# Patient Record
Sex: MALE | Race: WHITE | NOT HISPANIC OR LATINO | Employment: FULL TIME | URBAN - METROPOLITAN AREA
[De-identification: names, ages, dates, MRNs, and addresses within clinical notes are randomized per-mention and may not be internally consistent; named-entity substitution may affect disease eponyms.]

---

## 2017-08-30 ENCOUNTER — NON-PROVIDER VISIT (OUTPATIENT)
Dept: URGENT CARE | Facility: CLINIC | Age: 62
End: 2017-08-30

## 2017-08-30 PROCEDURE — 8907 PR URINE COLLECT ONLY: Performed by: PHYSICIAN ASSISTANT

## 2019-12-13 ENCOUNTER — HOSPITAL ENCOUNTER (EMERGENCY)
Facility: MEDICAL CENTER | Age: 64
End: 2019-12-13
Attending: EMERGENCY MEDICINE
Payer: COMMERCIAL

## 2019-12-13 ENCOUNTER — OFFICE VISIT (OUTPATIENT)
Dept: URGENT CARE | Facility: MEDICAL CENTER | Age: 64
End: 2019-12-13
Payer: COMMERCIAL

## 2019-12-13 VITALS
SYSTOLIC BLOOD PRESSURE: 120 MMHG | DIASTOLIC BLOOD PRESSURE: 74 MMHG | HEART RATE: 73 BPM | TEMPERATURE: 99.4 F | HEIGHT: 69 IN | BODY MASS INDEX: 25.74 KG/M2 | OXYGEN SATURATION: 98 % | WEIGHT: 173.8 LBS | RESPIRATION RATE: 16 BRPM

## 2019-12-13 VITALS
SYSTOLIC BLOOD PRESSURE: 115 MMHG | HEART RATE: 86 BPM | TEMPERATURE: 97.9 F | OXYGEN SATURATION: 96 % | RESPIRATION RATE: 18 BRPM | HEIGHT: 69 IN | BODY MASS INDEX: 25.83 KG/M2 | DIASTOLIC BLOOD PRESSURE: 90 MMHG | WEIGHT: 174.38 LBS

## 2019-12-13 DIAGNOSIS — R07.9 CHEST PAIN, UNSPECIFIED TYPE: ICD-10-CM

## 2019-12-13 DIAGNOSIS — J18.9 ATYPICAL PNEUMONIA: ICD-10-CM

## 2019-12-13 DIAGNOSIS — R05.9 COUGH: ICD-10-CM

## 2019-12-13 DIAGNOSIS — R53.83 OTHER FATIGUE: ICD-10-CM

## 2019-12-13 PROCEDURE — 99283 EMERGENCY DEPT VISIT LOW MDM: CPT

## 2019-12-13 PROCEDURE — 99203 OFFICE O/P NEW LOW 30 MIN: CPT | Performed by: FAMILY MEDICINE

## 2019-12-13 RX ORDER — AZITHROMYCIN 250 MG/1
TABLET, FILM COATED ORAL
Qty: 6 TAB | Refills: 0 | Status: SHIPPED | OUTPATIENT
Start: 2019-12-13

## 2019-12-13 NOTE — ED TRIAGE NOTES
"Chief Complaint   Patient presents with   • Cold Symptoms     x 1 month with productive cough     Pt reports that he would like abx for symptoms lasting >1 month. Sent from  for chest xray. NAD  /85   Pulse 86   Temp 36.6 °C (97.9 °F) (Temporal)   Resp 18   Ht 1.753 m (5' 9\")   Wt 79.1 kg (174 lb 6.1 oz)   SpO2 96%   Pt informed of wait times. Educated on triage process.  Asked to return to triage RN for any new or worsening of symptoms. Thanked for patience.        "

## 2019-12-14 NOTE — ED PROVIDER NOTES
"ED Provider Note    CHIEF COMPLAINT  Chief Complaint   Patient presents with   • Cold Symptoms     x 1 month with productive cough       HPI  Marie Marlow is a 64 y.o. male who presents requesting antibiotics for a cough that has been present for the past month or so associated with congestion of his sinuses, he went to urgent care where they suggested he had pneumonia and sent him here for an x-ray.  The patient just wants antibiotics, he has no fever, he has no chest pain or back pain or abdominal pain or vomiting.  He offers no other specific complaints at this time.  He did not get his flu shot this year stating that he does not believe in it although admits that he gets a pretty severe flulike illness over a year.    REVIEW OF SYSTEMS  Negative for fever, rash, chest pain, dyspnea, abdominal pain, nausea, vomiting, diarrhea, headache, focal weakness, focal numbness, focal tingling, back pain. All other systems are negative.     PAST MEDICAL HISTORY  Past Medical History:   Diagnosis Date   • Hypertension        FAMILY HISTORY  No family history on file.    SOCIAL HISTORY  Social History     Tobacco Use   • Smoking status: Never Smoker   Substance Use Topics   • Alcohol use: Yes   • Drug use: No       SURGICAL HISTORY  Past Surgical History:   Procedure Laterality Date   • SHOULDER ARTHROSCOPY         CURRENT MEDICATIONS  I personally reviewed the medication list in the charting documentation.     ALLERGIES  No Known Allergies    MEDICAL RECORD  I have reviewed patient's medical record and pertinent results are listed above.      PHYSICAL EXAM  VITAL SIGNS: /85   Pulse 86   Temp 36.6 °C (97.9 °F) (Temporal)   Resp 18   Ht 1.753 m (5' 9\")   Wt 79.1 kg (174 lb 6.1 oz)   SpO2 96%   BMI 25.75 kg/m²    Constitutional: Well appearing patient in no acute distress.  Not toxic, nor ill in appearance.  Sitting in the chair without any evidence of distress  HENT: Mucus membranes moist.    Eyes: No scleral " icterus. Normal conjunctiva   Neck: Supple, comfortable, nonpainful range of motion.   Cardiovascular: Regular heart rate and rhythm.   Thorax & Lungs: Chest is nontender.  Lungs are clear to auscultation with good air movement bilaterally.  No wheeze, rhonchi, nor rales.   Skin: Warm, dry. No rash appreciated  Neurologic: Alert & oriented. No focal deficits observed.   Psychiatric: Normal affect appropriate for the clinical situation.    COURSE & MEDICAL DECISION MAKING  I have reviewed any medical record information, laboratory studies and radiographic results as noted above.    Encounter Summary: This is a 64 y.o. male with productive cough for a month, no fever, sent here to rule out pneumonia, he is not hypoxic and on exam his lungs are clear.  His vital signs are normal.  At this point I suspect the patient likely has a viral syndrome but he is pretty adamant that he would like some antibiotics, after a month of productive cough this certainly could be an atypical pneumonia, oral antibiotics would be the treatment of choice given he is not ill-appearing or hypoxic so no indication for an x-ray.  Will be treated with azithromycin, strict return instruction will be discussed      DISPOSITION: Discharged home in stable condition      FINAL IMPRESSION  1. Atypical pneumonia    2. Cough           This dictation was created using voice recognition software. The accuracy of the dictation is limited to the abilities of the software. I expect there may be some errors of grammar and possibly content. The nursing notes were reviewed and certain aspects of this information were incorporated into this note.    Electronically signed by: José Carter, 12/13/2019 5:20 PM

## 2019-12-14 NOTE — PROGRESS NOTES
"Subjective:      Marie Marlow is a 64 y.o. male who presents with Sinus Pain ([id number], headache, sinus pain, chest congestion, fatigue)            This is a new problem.  64-year-old male presenting for evaluation of chest congestion and cough for past month.  He also has noticed midsternal chest pain for the past couple days.  He denies any fever chills but has noticed extreme fatigue.  He is otherwise healthy.  He is not a smoker.  No travel history or exposure to other ill contacts or pneumonia.  No history of CAD or early CAD family history.  Denies any dizziness or lightheadedness.  No fever reported.      Review of Systems   All other systems reviewed and are negative.         Objective:     /74 (BP Location: Left arm, Patient Position: Sitting, BP Cuff Size: Large adult)   Pulse 73   Temp 37.4 °C (99.4 °F) (Temporal)   Resp 16   Ht 1.753 m (5' 9\")   Wt 78.8 kg (173 lb 12.8 oz)   SpO2 98%   BMI 25.67 kg/m²      Physical Exam  Constitutional:       General: He is not in acute distress.     Appearance: He is not ill-appearing, toxic-appearing or diaphoretic.   HENT:      Head: Normocephalic and atraumatic.      Right Ear: Tympanic membrane, ear canal and external ear normal.      Left Ear: Tympanic membrane, ear canal and external ear normal.      Nose: No rhinorrhea.      Mouth/Throat:      Mouth: Mucous membranes are moist.      Pharynx: Oropharynx is clear. No oropharyngeal exudate or posterior oropharyngeal erythema.   Eyes:      General: No scleral icterus.     Conjunctiva/sclera: Conjunctivae normal.   Neck:      Musculoskeletal: Neck supple. No muscular tenderness.   Cardiovascular:      Rate and Rhythm: Normal rate and regular rhythm.      Heart sounds: No murmur. No friction rub. No gallop.    Pulmonary:      Effort: Pulmonary effort is normal. No respiratory distress.      Breath sounds: No wheezing, rhonchi or rales.   Lymphadenopathy:      Cervical: No cervical adenopathy.   Skin:    "  General: Skin is warm.      Coloration: Skin is not jaundiced or pale.      Findings: No rash.   Neurological:      Mental Status: He is alert and oriented to person, place, and time.   Psychiatric:         Mood and Affect: Mood normal.          EKG shows normal sinus rhythm with no acute changes.     Assessment/Plan:     1. Chest pain, unspecified type  - EKG - Clinic Performed    2. Cough    3. Other fatigue      Patient reports a month history of cough which would definitely require a chest x-ray which we unfortunately cannot do at the present time in our clinic as our radiology diverting and not accepting any x-rays from us because they are backed up.  He also complaining of midsternal chest pain for the past couple days in a 64-year-old non-smoker which would require basic cardiac work-up.  I decided therefore to send this patient to our emergency department for further evaluation

## 2019-12-28 ENCOUNTER — HOSPITAL ENCOUNTER (EMERGENCY)
Facility: MEDICAL CENTER | Age: 64
End: 2019-12-28
Attending: EMERGENCY MEDICINE
Payer: COMMERCIAL

## 2019-12-28 VITALS
RESPIRATION RATE: 18 BRPM | WEIGHT: 176.37 LBS | OXYGEN SATURATION: 97 % | TEMPERATURE: 97.5 F | BODY MASS INDEX: 26.12 KG/M2 | HEART RATE: 73 BPM | SYSTOLIC BLOOD PRESSURE: 126 MMHG | HEIGHT: 69 IN | DIASTOLIC BLOOD PRESSURE: 77 MMHG

## 2019-12-28 DIAGNOSIS — R11.2 NON-INTRACTABLE VOMITING WITH NAUSEA, UNSPECIFIED VOMITING TYPE: ICD-10-CM

## 2019-12-28 LAB
ALBUMIN SERPL BCP-MCNC: 4.3 G/DL (ref 3.2–4.9)
ALBUMIN/GLOB SERPL: 1.3 G/DL
ALP SERPL-CCNC: 86 U/L (ref 30–99)
ALT SERPL-CCNC: 31 U/L (ref 2–50)
ANION GAP SERPL CALC-SCNC: 14 MMOL/L (ref 0–11.9)
AST SERPL-CCNC: 28 U/L (ref 12–45)
BASOPHILS # BLD AUTO: 1.1 % (ref 0–1.8)
BASOPHILS # BLD: 0.07 K/UL (ref 0–0.12)
BILIRUB SERPL-MCNC: 0.3 MG/DL (ref 0.1–1.5)
BUN SERPL-MCNC: 18 MG/DL (ref 8–22)
CALCIUM SERPL-MCNC: 9.3 MG/DL (ref 8.4–10.2)
CHLORIDE SERPL-SCNC: 103 MMOL/L (ref 96–112)
CO2 SERPL-SCNC: 24 MMOL/L (ref 20–33)
CREAT SERPL-MCNC: 1.36 MG/DL (ref 0.5–1.4)
EKG IMPRESSION: NORMAL
EOSINOPHIL # BLD AUTO: 0.22 K/UL (ref 0–0.51)
EOSINOPHIL NFR BLD: 3.3 % (ref 0–6.9)
ERYTHROCYTE [DISTWIDTH] IN BLOOD BY AUTOMATED COUNT: 39 FL (ref 35.9–50)
GLOBULIN SER CALC-MCNC: 3.3 G/DL (ref 1.9–3.5)
GLUCOSE SERPL-MCNC: 105 MG/DL (ref 65–99)
HCT VFR BLD AUTO: 44.3 % (ref 42–52)
HGB BLD-MCNC: 15.2 G/DL (ref 14–18)
IMM GRANULOCYTES # BLD AUTO: 0.02 K/UL (ref 0–0.11)
IMM GRANULOCYTES NFR BLD AUTO: 0.3 % (ref 0–0.9)
LIPASE SERPL-CCNC: 36 U/L (ref 7–58)
LYMPHOCYTES # BLD AUTO: 2.23 K/UL (ref 1–4.8)
LYMPHOCYTES NFR BLD: 33.7 % (ref 22–41)
MCH RBC QN AUTO: 30.3 PG (ref 27–33)
MCHC RBC AUTO-ENTMCNC: 34.3 G/DL (ref 33.7–35.3)
MCV RBC AUTO: 88.4 FL (ref 81.4–97.8)
MONOCYTES # BLD AUTO: 0.54 K/UL (ref 0–0.85)
MONOCYTES NFR BLD AUTO: 8.2 % (ref 0–13.4)
NEUTROPHILS # BLD AUTO: 3.53 K/UL (ref 1.82–7.42)
NEUTROPHILS NFR BLD: 53.4 % (ref 44–72)
NRBC # BLD AUTO: 0 K/UL
NRBC BLD-RTO: 0 /100 WBC
PLATELET # BLD AUTO: 299 K/UL (ref 164–446)
PMV BLD AUTO: 9.5 FL (ref 9–12.9)
POTASSIUM SERPL-SCNC: 4.2 MMOL/L (ref 3.6–5.5)
PROT SERPL-MCNC: 7.6 G/DL (ref 6–8.2)
RBC # BLD AUTO: 5.01 M/UL (ref 4.7–6.1)
SODIUM SERPL-SCNC: 141 MMOL/L (ref 135–145)
TROPONIN T SERPL-MCNC: 14 NG/L (ref 6–19)
WBC # BLD AUTO: 6.6 K/UL (ref 4.8–10.8)

## 2019-12-28 PROCEDURE — 84484 ASSAY OF TROPONIN QUANT: CPT

## 2019-12-28 PROCEDURE — 93005 ELECTROCARDIOGRAM TRACING: CPT | Performed by: EMERGENCY MEDICINE

## 2019-12-28 PROCEDURE — 96374 THER/PROPH/DIAG INJ IV PUSH: CPT

## 2019-12-28 PROCEDURE — 83690 ASSAY OF LIPASE: CPT

## 2019-12-28 PROCEDURE — 700102 HCHG RX REV CODE 250 W/ 637 OVERRIDE(OP): Performed by: EMERGENCY MEDICINE

## 2019-12-28 PROCEDURE — 85025 COMPLETE CBC W/AUTO DIFF WBC: CPT

## 2019-12-28 PROCEDURE — A9270 NON-COVERED ITEM OR SERVICE: HCPCS | Performed by: EMERGENCY MEDICINE

## 2019-12-28 PROCEDURE — 99284 EMERGENCY DEPT VISIT MOD MDM: CPT

## 2019-12-28 PROCEDURE — 80053 COMPREHEN METABOLIC PANEL: CPT

## 2019-12-28 PROCEDURE — 700111 HCHG RX REV CODE 636 W/ 250 OVERRIDE (IP): Performed by: EMERGENCY MEDICINE

## 2019-12-28 RX ORDER — ALUMINA, MAGNESIA, AND SIMETHICONE 2400; 2400; 240 MG/30ML; MG/30ML; MG/30ML
10 SUSPENSION ORAL 4 TIMES DAILY PRN
Qty: 560 ML | Refills: 0 | Status: SHIPPED | OUTPATIENT
Start: 2019-12-28

## 2019-12-28 RX ORDER — FAMOTIDINE 40 MG/1
40 TABLET, FILM COATED ORAL DAILY
Qty: 20 TAB | Refills: 0 | Status: SHIPPED | OUTPATIENT
Start: 2019-12-28 | End: 2020-01-17

## 2019-12-28 RX ORDER — ONDANSETRON 2 MG/ML
4 INJECTION INTRAMUSCULAR; INTRAVENOUS ONCE
Status: COMPLETED | OUTPATIENT
Start: 2019-12-28 | End: 2019-12-28

## 2019-12-28 RX ORDER — ONDANSETRON 4 MG/1
4 TABLET, ORALLY DISINTEGRATING ORAL EVERY 6 HOURS PRN
Qty: 12 TAB | Refills: 0 | Status: SHIPPED | OUTPATIENT
Start: 2019-12-28

## 2019-12-28 RX ADMIN — LIDOCAINE HYDROCHLORIDE 15 ML: 20 SOLUTION OROPHARYNGEAL at 17:18

## 2019-12-28 RX ADMIN — ONDANSETRON HYDROCHLORIDE 4 MG: 2 SOLUTION INTRAMUSCULAR; INTRAVENOUS at 17:18

## 2019-12-29 NOTE — ED PROVIDER NOTES
ED Provider Note    CHIEF COMPLAINT  Chief Complaint   Patient presents with   • N/V       HPI  Marie Marlow is a 64 y.o. male who presents with chief complaint of nausea and vomiting.  Patient reports symptoms for the last 3 days.  Patient reports of progressively worsened.  He reports just feeling incredibly nauseous and dry heaving throughout the day.  He did have 2 episodes of nonbloody nonbilious emesis.  He reports a mild associated epigastric pain with his symptoms.  He does have a history of poor functioning gallbladder which was found on a HIDA scan years ago but he has not had a cholecystectomy.  He denies any associated right upper quadrant pain.  Reports normal bowel movements without any melena or hematochezia.  Patient denies any fevers or constitutional symptoms aside from his nausea and vomiting.  Patient has tried probiotics, eating class, but despite this his symptoms persist.  He denies any associated dizziness, room spinning, weakness or numbness, difficulty ambulating, headache, chest pain or shortness of breath.  He denies any associated palpitations.  He denies any associated diaphoresis.  Patient reports he is never had any issues with alcoholism, he denies any drug use.    REVIEW OF SYSTEMS  ROS    See HPI for further details. All other systems are negative.     PAST MEDICAL HISTORY   has a past medical history of Hypertension.    SOCIAL HISTORY  Social History     Tobacco Use   • Smoking status: Never Smoker   • Smokeless tobacco: Never Used   Substance and Sexual Activity   • Alcohol use: Yes     Comment: Occasionally   • Drug use: No   • Sexual activity: Not on file       SURGICAL HISTORY   has a past surgical history that includes shoulder arthroscopy.    CURRENT MEDICATIONS  Home Medications    **Home medications have not yet been reviewed for this encounter**         ALLERGIES  No Known Allergies    PHYSICAL EXAM  Physical Exam   Constitutional: He is oriented to person, place, and  time. He appears well-developed and well-nourished.   HENT:   Head: Normocephalic and atraumatic.   Eyes: Pupils are equal, round, and reactive to light. Conjunctivae are normal.   Neck: Normal range of motion. Neck supple.   Cardiovascular: Normal rate and regular rhythm. Exam reveals no gallop and no friction rub.   No murmur heard.  Pulmonary/Chest: Effort normal and breath sounds normal. No respiratory distress. He has no wheezes.   Abdominal: Soft. Bowel sounds are normal. He exhibits no distension. There is no tenderness. There is no rebound.   Neurological: He is alert and oriented to person, place, and time.   Distal and proximal strength 5/5 in upper and lower extremities. Normal gait. No dysmetria. No sensation deficits. No visual field deficits. Cranial nerves intact.      Skin: Skin is warm and dry.   Psychiatric: He has a normal mood and affect. His behavior is normal.         DIAGNOSTIC STUDIES / PROCEDURES    EKG  See below    LABS  Results for orders placed or performed during the hospital encounter of 12/28/19   CBC WITH DIFFERENTIAL   Result Value Ref Range    WBC 6.6 4.8 - 10.8 K/uL    RBC 5.01 4.70 - 6.10 M/uL    Hemoglobin 15.2 14.0 - 18.0 g/dL    Hematocrit 44.3 42.0 - 52.0 %    MCV 88.4 81.4 - 97.8 fL    MCH 30.3 27.0 - 33.0 pg    MCHC 34.3 33.7 - 35.3 g/dL    RDW 39.0 35.9 - 50.0 fL    Platelet Count 299 164 - 446 K/uL    MPV 9.5 9.0 - 12.9 fL    Neutrophils-Polys 53.40 44.00 - 72.00 %    Lymphocytes 33.70 22.00 - 41.00 %    Monocytes 8.20 0.00 - 13.40 %    Eosinophils 3.30 0.00 - 6.90 %    Basophils 1.10 0.00 - 1.80 %    Immature Granulocytes 0.30 0.00 - 0.90 %    Nucleated RBC 0.00 /100 WBC    Neutrophils (Absolute) 3.53 1.82 - 7.42 K/uL    Lymphs (Absolute) 2.23 1.00 - 4.80 K/uL    Monos (Absolute) 0.54 0.00 - 0.85 K/uL    Eos (Absolute) 0.22 0.00 - 0.51 K/uL    Baso (Absolute) 0.07 0.00 - 0.12 K/uL    Immature Granulocytes (abs) 0.02 0.00 - 0.11 K/uL    NRBC (Absolute) 0.00 K/uL   CMP    Result Value Ref Range    Sodium 141 135 - 145 mmol/L    Potassium 4.2 3.6 - 5.5 mmol/L    Chloride 103 96 - 112 mmol/L    Co2 24 20 - 33 mmol/L    Anion Gap 14.0 (H) 0.0 - 11.9    Glucose 105 (H) 65 - 99 mg/dL    Bun 18 8 - 22 mg/dL    Creatinine 1.36 0.50 - 1.40 mg/dL    Calcium 9.3 8.4 - 10.2 mg/dL    AST(SGOT) 28 12 - 45 U/L    ALT(SGPT) 31 2 - 50 U/L    Alkaline Phosphatase 86 30 - 99 U/L    Total Bilirubin 0.3 0.1 - 1.5 mg/dL    Albumin 4.3 3.2 - 4.9 g/dL    Total Protein 7.6 6.0 - 8.2 g/dL    Globulin 3.3 1.9 - 3.5 g/dL    A-G Ratio 1.3 g/dL   LIPASE   Result Value Ref Range    Lipase 36 7 - 58 U/L   TROPONIN   Result Value Ref Range    Troponin T 14 6 - 19 ng/L   ESTIMATED GFR   Result Value Ref Range    GFR If African American >60 >60 mL/min/1.73 m 2    GFR If Non  53 (A) >60 mL/min/1.73 m 2   EKG   Result Value Ref Range    Report       [facility]    Test Date:  2019  Pt Name:    CHARMAINE CALIXTO                   Department: [facility]  MRN:        [id number]                      [facility]   Gender:     Male                         Technician: HRR  :        [id number]                   Requested By:CHEYENNE BERNSTEIN  Order #:    [id number]                    Reading MD: Krishna Fernandez MD    Measurements  Intervals                                Axis  Rate:       72                           P:          52  WY:         156                          QRS:        2  QRSD:       86                           T:          35  QT:         380  QTc:        416    Interpretive Statements  EKG is normal sinus rhythm normal axis normal intervals no ST changes  consistent  with acute regional ischemia  Electronically Signed On 2019 17:46:47 PST by Krishna Fernandez MD           RADIOLOGY  No orders to display           COURSE & MEDICAL DECISION MAKING  Pertinent Labs & Imaging studies reviewed. (See chart for details)  Very well-appearing patient here with  symptoms most consistent with gastritis, pancreatitis is possible though patient's exam is entirely benign.  Neurologic etiology is unlikely as patient without any other neurologic complaints and has an entirely normal neurologic exam.  Cardiovascular etiology is possible though considerably less likely given patient's lack of any chest pain or shortness of breath.  Screening EKG is very reassuring.  A troponin was sent and was normal as well.  Patient had basic labs which were also reassuring though consistent with mild dehydration.  Patient symptoms have resolved following GI cocktail and Zofran.  He is requesting discharge home.  I discussed return precautions in depth with this patient, we have discussed checking a CT however patient is comfortable returning home and lieu of strict return precautions.  Given patient's benign exam I believe deferring CT is acceptable.   The patient will return for worsening symptoms and is stable at the time of discharge. The patient verbalizes understanding and will comply.    FINAL IMPRESSION    1. Non-intractable vomiting with nausea, unspecified vomiting type               Electronically signed by: Jim Keller, 12/28/2019 4:54 PM

## 2019-12-29 NOTE — DISCHARGE INSTRUCTIONS
Your laboratory results today were very reassuring, however we did not take any imaging at this point because your exam was very reassuring as well.  If your symptoms fail to improve despite the medicine we are sending you home on in the next few days or if they worsen please return to the emergency department.

## 2019-12-29 NOTE — ED TRIAGE NOTES
"Pt was seen in our facility approximately 2 weeks ago, and treated with a Z Pack for recurring cough and sinusitis.  He returns today complaining of episodic N/V for the past few days.  Chief Complaint   Patient presents with   • N/V     /90   Pulse 77   Temp 36.4 °C (97.5 °F) (Temporal)   Resp 18   Ht 1.753 m (5' 9\")   Wt 80 kg (176 lb 5.9 oz)   SpO2 98%   BMI 26.05 kg/m²      "

## 2022-01-22 ENCOUNTER — HOSPITAL ENCOUNTER (EMERGENCY)
Facility: MEDICAL CENTER | Age: 67
End: 2022-01-23
Attending: EMERGENCY MEDICINE
Payer: COMMERCIAL

## 2022-01-22 DIAGNOSIS — S61.210A LACERATION OF RIGHT INDEX FINGER WITHOUT FOREIGN BODY WITHOUT DAMAGE TO NAIL, INITIAL ENCOUNTER: ICD-10-CM

## 2022-01-22 DIAGNOSIS — S61.011A LACERATION OF RIGHT THUMB WITHOUT FOREIGN BODY WITHOUT DAMAGE TO NAIL, INITIAL ENCOUNTER: ICD-10-CM

## 2022-01-22 PROCEDURE — 99283 EMERGENCY DEPT VISIT LOW MDM: CPT

## 2022-01-22 PROCEDURE — 303353 HCHG DERMABOND SKIN ADHESIVE

## 2022-01-22 PROCEDURE — 304217 HCHG IRRIGATION SYSTEM

## 2022-01-22 PROCEDURE — 304999 HCHG REPAIR-SIMPLE/INTERMED LEVEL 1

## 2022-01-23 ENCOUNTER — APPOINTMENT (OUTPATIENT)
Dept: RADIOLOGY | Facility: MEDICAL CENTER | Age: 67
End: 2022-01-23
Attending: EMERGENCY MEDICINE
Payer: COMMERCIAL

## 2022-01-23 VITALS
RESPIRATION RATE: 14 BRPM | OXYGEN SATURATION: 98 % | HEIGHT: 71 IN | HEART RATE: 73 BPM | SYSTOLIC BLOOD PRESSURE: 145 MMHG | TEMPERATURE: 97.6 F | DIASTOLIC BLOOD PRESSURE: 95 MMHG | BODY MASS INDEX: 25.15 KG/M2 | WEIGHT: 179.68 LBS

## 2022-01-23 PROCEDURE — 73130 X-RAY EXAM OF HAND: CPT | Mod: RT

## 2022-01-23 NOTE — ED NOTES
PT thumb has been soaked and cleaned out. Pt awaiting xray at this time. Pt declines questions about care plan at this time.

## 2022-01-23 NOTE — ED PROVIDER NOTES
"ER Provider Note     Scribed for Gray Last M.D. by Jerrod James. 1/22/2022, 11:39 PM.    Primary Care Provider: Pcp Pt States None  Means of Arrival: Walk-in   History obtained from: Patient  History limited by: None     CHIEF COMPLAINT  Chief Complaint   Patient presents with    Hand Laceration     HPI  Marie Marlow is a 66 y.o. male who presents to the Emergency Department for right hand lacerations that occurred 1 hour prior to arrival. The patient states he was using a glass cooking pot when it broke and cut the patient's right index finger and right thumb. He reports associated pain in his right hand. No exacerbating factors were identified. The patient denies active bleeding, numbness, or tingling in his right hand.     REVIEW OF SYSTEMS  See HPI for further details.     PAST MEDICAL HISTORY   has a past medical history of Hypertension.    SURGICAL HISTORY   has a past surgical history that includes shoulder arthroscopy.    SOCIAL HISTORY  Social History     Tobacco Use    Smoking status: Never Smoker    Smokeless tobacco: Never Used   Substance Use Topics    Alcohol use: Yes     Comment: Occasionally    Drug use: No      Social History     Substance and Sexual Activity   Drug Use No     FAMILY HISTORY  No family history noted.    CURRENT MEDICATIONS  Home Medications       Reviewed by Court Moreno R.N. (Registered Nurse) on 01/22/22 at 2246  Med List Status: <None>     Medication Last Dose Status   azithromycin (ZITHROMAX) 250 MG Tab  Active   mag hydrox-al hydrox-simeth (MAALOX PLUS ES OR MYLANTA DS) 400-400-40 MG/5ML Suspension  Active   ondansetron (ZOFRAN ODT) 4 MG TABLET DISPERSIBLE  Active                  ALLERGIES  No Known Allergies    PHYSICAL EXAM  VITAL SIGNS: /67   Pulse 64   Temp 36.4 °C (97.6 °F) (Temporal)   Resp 16   Ht 1.798 m (5' 10.8\")   Wt 81.5 kg (179 lb 10.8 oz)   SpO2 97%   BMI 25.20 kg/m²    Constitutional: Alert in no apparent distress.  HENT: No " signs of trauma, Bilateral external ears normal, Nose normal.   Eyes: Pupils are equal and reactive, Conjunctiva normal, Non-icteric.   Neck: Normal range of motion, No tenderness, Supple, No stridor.   Lymphatic: No lymphadenopathy noted.   Cardiovascular: Regular rate and rhythm, no palpable thrill  Thorax & Lungs: No respiratory distress,  No chest tenderness.  CTAB  Abdomen: Bowel sounds normal, Soft, No tenderness, No masses, No pulsatile masses. No peritoneal signs.  Skin: Warm, Dry, No erythema, No rash.   Back: No bony tenderness, No CVA tenderness.   Extremities: Intact distal pulses, Partial thickness laceration over radial aspect of index finger, Full thickness laceration over pad of right thumb, No edema, No tenderness, No cyanosis.  Musculoskeletal: Good range of motion in all major joints. No tenderness to palpation or major deformities noted.   Neurologic: Alert , Normal motor function, Normal sensory function, No focal deficits noted.   Psychiatric: Affect normal, Judgment normal, Mood normal.     RADIOLOGY AND PROCEDURE  DX-HAND 3+ RIGHT   Final Result      1.  No radiographic evidence of acute traumatic injury.   2.  No radiopaque foreign body.        The radiologist's interpretation of all radiological studies have been reviewed by me.    Laceration Repair Procedure Note    Indication: Laceration    Procedure: The patient was placed in the appropriate position and anesthesia around the thumb laceration was not performed at the patient's request. The area was then cleansed with betadine and draped in a sterile fashion. The laceration was closed with Dermabond. There were no additional lacerations requiring repair. The wound area was then dressed with a bandage.      Total repaired wound length: 1 cm.     Other Items: None    The patient tolerated the procedure well.    Complications: None    COURSE & MEDICAL DECISION MAKING  Pertinent Labs & Imaging studies reviewed. (See chart for details)    This  is a 66 y.o. male that presents with a laceration on his index finger as well as his thumb.  The index finger does not require any repair in the thumb I can use Dermabond.  I will get an x-ray to assure that there is no foreign object.  We will sure that his tetanus status is up-to-date..     11:39 PM - Patient seen and examined at bedside. Ordered DX-right hand. Patient will be medicated with  for his symptoms.     12:50 PM - Laceration repair performed by me as outlined above. I reevaluated the patient at bedside. I discussed plan for discharge and follow up as outlined below. The patient is stable for discharge at this time and will return for any new or worsening symptoms. Patient verbalizes understanding and support with my plan for discharge.     Patient has no radiopaque foreign object.  We will discharge him home after the thumb laceration is repaired with Dermabond.    The patient is referred to a primary physician for blood pressure management, diabetic screening, and for all other preventative health concerns.    DISPOSITION:  Patient will be discharged home in stable condition.    FOLLOW UP:  [facility]  [address] [place] [id number]  [phone]  In 1 week  For wound re-check    FINAL IMPRESSION  1. Laceration of right thumb without foreign body without damage to nail, initial encounter    2. Laceration of right index finger without foreign body without damage to nail, initial encounter    3. Laceration repair procedure     Jerrod WILSON (Hedy), am scribing for, and in the presence of, Gray Last M.D..    Electronically signed by: Jerrod James (Hedy), 1/22/2022    Gray WILSON M.D. personally performed the services described in this documentation, as scribed by Jerrod James in my presence, and it is both accurate and complete. E.    The note accurately reflects work and decisions made by me.  Gray Last M.D.  1/23/2022  4:52 AM

## 2022-01-23 NOTE — ED TRIAGE NOTES
Right hand laceration aprox 1hr pta with a glass pot no loss of sensation laceration to index and thumb no active bleeding

## 2025-01-14 ENCOUNTER — HOSPITAL ENCOUNTER (EMERGENCY)
Facility: MEDICAL CENTER | Age: 70
End: 2025-01-14
Attending: STUDENT IN AN ORGANIZED HEALTH CARE EDUCATION/TRAINING PROGRAM
Payer: COMMERCIAL

## 2025-01-14 VITALS
TEMPERATURE: 96.9 F | SYSTOLIC BLOOD PRESSURE: 158 MMHG | DIASTOLIC BLOOD PRESSURE: 101 MMHG | WEIGHT: 166.45 LBS | HEART RATE: 94 BPM | HEIGHT: 69 IN | RESPIRATION RATE: 18 BRPM | BODY MASS INDEX: 24.65 KG/M2 | OXYGEN SATURATION: 98 %

## 2025-01-14 PROCEDURE — 302449 STATCHG TRIAGE ONLY (STATISTIC)

## 2025-01-14 PROCEDURE — 302446

## 2025-01-15 NOTE — ED NOTES
Orders placed in error. All orders placed by this RN at 1727 were placed in error and discontinued.

## 2025-01-24 ENCOUNTER — HOSPITAL ENCOUNTER (EMERGENCY)
Facility: MEDICAL CENTER | Age: 70
End: 2025-01-24
Attending: EMERGENCY MEDICINE

## 2025-01-24 VITALS
BODY MASS INDEX: 25.08 KG/M2 | RESPIRATION RATE: 16 BRPM | WEIGHT: 169.31 LBS | HEART RATE: 99 BPM | DIASTOLIC BLOOD PRESSURE: 118 MMHG | HEIGHT: 69 IN | TEMPERATURE: 96.9 F | SYSTOLIC BLOOD PRESSURE: 172 MMHG | OXYGEN SATURATION: 98 %

## 2025-01-24 DIAGNOSIS — R04.0 POSTERIOR EPISTAXIS: ICD-10-CM

## 2025-01-24 DIAGNOSIS — I10 UNCONTROLLED HYPERTENSION: ICD-10-CM

## 2025-01-24 PROCEDURE — 99281 EMR DPT VST MAYX REQ PHY/QHP: CPT

## 2025-01-25 NOTE — ED TRIAGE NOTES
Vitals:    01/24/25 1830   BP: (!) 172/118   Pulse: 99   Resp: 16   Temp: 36.1 °C (96.9 °F)   SpO2: 98%     Chief Complaint   Patient presents with    Nose Bleed     Pt was seen at [facility] a couple of weeks ago for a nose bleed, a rhino rocket was placed. He then had it removed a few days later and was doing well until tonight when it began bleeding again.     Pt is ambulatory to and from triage and is alert and oriented x 4.

## 2025-01-25 NOTE — DISCHARGE INSTRUCTIONS
I have placed referrals to establish with a primary care doctor and an ENT.  Continue using the nail in spray 2-3 times a day and do humidifier at night.  If the nosebleed starts up again immediately placed the clamp.  If it is still bleeding in 10 to 15 minutes, blow your nose and put 2 to 3 sprays of Afrin up the left nostril and put the clamp back on for another 15 minutes.  If bleeding persist come back to the ER.  The ENT should be able to use a fiberoptic camera to visualize the area that is bleeding in the posterior nose.  Your primary care doctor can talk about your blood pressure as this can contribute to nosebleeds and also obtain blood work to see if there is any other underlying cause of your nosebleeds.

## 2025-01-25 NOTE — ED PROVIDER NOTES
"ED Provider Note    CHIEF COMPLAINT  Chief Complaint   Patient presents with    Nose Bleed       EXTERNAL RECORDS REVIEWED  None    HPI/ROS  LIMITATION TO HISTORY   Select: : None  OUTSIDE HISTORIAN(S):  Family      Marie Marlow is a 69 y.o. male who presents to the ER for epistaxis.  Last week he had left-sided epistaxis and had to have a Rhino Rocket placed at outside facility.  These records are not available.  This was left in place for a few days and then he had it removed here on the 14th.  He has not seen ENT.  While laying in bed today started bleeding again.  He placed a nose clamp.  Since having this on it is now off and it has stopped bleeding.  He does not feel any blood dripping down the oropharynx.  No bleeding or bruising elsewhere.  No history of this.  No nasal trauma.  Does not have a PCP and has not been seen in many years    PAST MEDICAL HISTORY   has a past medical history of Hypertension.    SURGICAL HISTORY   has a past surgical history that includes shoulder arthroscopy.    FAMILY HISTORY  No family history on file.    SOCIAL HISTORY  Social History     Tobacco Use    Smoking status: Never    Smokeless tobacco: Never   Substance and Sexual Activity    Alcohol use: Yes     Comment: Occasionally    Drug use: No    Sexual activity: Not on file       CURRENT MEDICATIONS  Home Medications       Reviewed by Natalya Gay R.N. (Registered Nurse) on 01/24/25 at 1837  Med List Status: Partial     Medication Last Dose Status   azithromycin (ZITHROMAX) 250 MG Tab  Active   mag hydrox-al hydrox-simeth (MAALOX PLUS ES OR MYLANTA DS) 400-400-40 MG/5ML Suspension  Active   ondansetron (ZOFRAN ODT) 4 MG TABLET DISPERSIBLE  Active                    ALLERGIES  No Known Allergies    PHYSICAL EXAM  VITAL SIGNS: BP (!) 172/118   Pulse 99   Temp 36.1 °C (96.9 °F) (Temporal)   Resp 16   Ht 1.753 m (5' 9\")   Wt 76.8 kg (169 lb 5 oz)   SpO2 98%   BMI 25.00 kg/m²    General: Laying calmly in " stretcher, no distress  HEENT: NCAT, moist mucous membranes, no blood in the posterior oropharynx, bilateral nostrils with mildly inflamed nasal mucosa, dried blood in the left, no active bleeding or foci of prior bleeding  CV: Regular rate and rhythm no murmurs  Pulmonary: CTAB normal work of breathing          COURSE & MEDICAL DECISION MAKING    ASSESSMENT, COURSE AND PLAN  Care Narrative: Differential includes allergic rhinitis, viral rhinitis, bleed from Kiesselbach plexus, thrombocytopenia, coagulopathy    On arrival the patient is well-appearing.  He is hypertensive states he has had this for years but is not on medications as he tries to be natural/homeopathic.  Has not followed with a primary doctor in many years for this.  Bleeding has stopped and I do not see any evidence of active bleeding, nor any targets to cauterize.  Differential above considered.  Given that this is a new problem for him I did recommend blood workup to rule out coagulopathy, thrombocytopenia, renal disease.  However given that bleeding had stopped now patient requested to be discharged home to follow-up with her primary doctor to get labs placed.  Given that he does not have any other symptoms I felt this was reasonable.  I did place a referral to establish with a PCP.  He will also follow-up with his PCP regarding his blood pressure.  He is not having any signs or symptoms of hypertensive emergency at this time.  No indication for acute treatment.  I did also place a referral to ENT as he likely has a posterior nasal bleed that keeps coming and going.  I recommended saline spray, humidifiers to help with dry nasal mucosa.  Return precautions were provided.  Discharged home in stable condition    DISPOSITION AND DISCUSSIONS    Escalation of care considered, and ultimately not performed:after discussion with the patient / family, they have elected to decline an escalation in care    Barriers to care at this time, including but not  limited to: Patient does not have established PCP.     Decision tools and prescription drugs considered including, but not limited to: None.    FINAL DIAGNOSIS  1. Posterior epistaxis    2. Uncontrolled hypertension         Electronically signed by: Derik Brooks M.D., 1/24/2025 7:07 PM

## 2025-01-25 NOTE — ED NOTES
Bedside report received from Lou HARPER. Patient connected to cardiac monitor, pulse ox, and automatic BP. Fall precautions in place. Call light within reach. No supplemental O2 use at this time.

## 2025-01-25 NOTE — ED NOTES
Patient identity verified in lobby. Patient ambulated independently with steady gait to yellow 67.    This RN agrees with triage note. Patient connected to continuous monitor with call light and personal belongings within reach. Louie locked and in the lowest position.

## 2025-03-19 ENCOUNTER — OFFICE VISIT (OUTPATIENT)
Dept: MEDICAL GROUP | Facility: IMAGING CENTER | Age: 70
End: 2025-03-19
Attending: EMERGENCY MEDICINE
Payer: COMMERCIAL

## 2025-03-19 VITALS
HEART RATE: 82 BPM | WEIGHT: 160 LBS | RESPIRATION RATE: 16 BRPM | OXYGEN SATURATION: 97 % | DIASTOLIC BLOOD PRESSURE: 74 MMHG | TEMPERATURE: 98.7 F | BODY MASS INDEX: 23.7 KG/M2 | SYSTOLIC BLOOD PRESSURE: 142 MMHG | HEIGHT: 69 IN

## 2025-03-19 DIAGNOSIS — K21.9 GASTROESOPHAGEAL REFLUX DISEASE, UNSPECIFIED WHETHER ESOPHAGITIS PRESENT: ICD-10-CM

## 2025-03-19 DIAGNOSIS — M54.41 CHRONIC RIGHT-SIDED LOW BACK PAIN WITH RIGHT-SIDED SCIATICA: ICD-10-CM

## 2025-03-19 DIAGNOSIS — M54.2 CHRONIC NECK PAIN: ICD-10-CM

## 2025-03-19 DIAGNOSIS — Z12.11 COLON CANCER SCREENING: ICD-10-CM

## 2025-03-19 DIAGNOSIS — G89.29 CHRONIC RIGHT-SIDED LOW BACK PAIN WITH RIGHT-SIDED SCIATICA: ICD-10-CM

## 2025-03-19 DIAGNOSIS — Z11.59 NEED FOR HEPATITIS C SCREENING TEST: ICD-10-CM

## 2025-03-19 DIAGNOSIS — R04.0 EPISTAXIS: ICD-10-CM

## 2025-03-19 DIAGNOSIS — I10 PRIMARY HYPERTENSION: ICD-10-CM

## 2025-03-19 DIAGNOSIS — R13.10 DYSPHAGIA, UNSPECIFIED TYPE: ICD-10-CM

## 2025-03-19 DIAGNOSIS — Z00.00 WELLNESS EXAMINATION: ICD-10-CM

## 2025-03-19 DIAGNOSIS — Z12.5 PROSTATE CANCER SCREENING: ICD-10-CM

## 2025-03-19 DIAGNOSIS — G89.29 CHRONIC NECK PAIN: ICD-10-CM

## 2025-03-19 PROBLEM — R13.19 OTHER DYSPHAGIA: Status: ACTIVE | Noted: 2025-03-19

## 2025-03-19 PROBLEM — M17.9 OSTEOARTHRITIS OF KNEE: Status: ACTIVE | Noted: 2018-05-16

## 2025-03-19 PROCEDURE — 3077F SYST BP >= 140 MM HG: CPT

## 2025-03-19 PROCEDURE — 3078F DIAST BP <80 MM HG: CPT

## 2025-03-19 PROCEDURE — 99204 OFFICE O/P NEW MOD 45 MIN: CPT

## 2025-03-19 RX ORDER — LOSARTAN POTASSIUM 25 MG/1
25 TABLET ORAL DAILY
Qty: 30 TABLET | Refills: 1 | Status: SHIPPED | OUTPATIENT
Start: 2025-03-19 | End: 2026-04-23

## 2025-03-19 ASSESSMENT — PAIN SCALES - GENERAL: PAINLEVEL_OUTOF10: 8=MODERATE-SEVERE PAIN

## 2025-03-19 ASSESSMENT — PATIENT HEALTH QUESTIONNAIRE - PHQ9
SUM OF ALL RESPONSES TO PHQ QUESTIONS 1-9: 11
5. POOR APPETITE OR OVEREATING: 1 - SEVERAL DAYS
CLINICAL INTERPRETATION OF PHQ2 SCORE: 2

## 2025-03-19 NOTE — PROGRESS NOTES
Chief Complaint   Patient presents with    Establish Care     Uncontrolled hypertension no prev pcp    Other     Pt reports broken vein in nose was bleeding for a month went to ENT    Nerve Pain     Pt reports issue with sciatic nerve right side getting worse x over two years taking has been taking tylenol     HISTORY OF THE PRESENT ILLNESS: Patient is a 69 y.o. male. This pleasant patient is here today to establish care and to discuss:    To establish care  Previous PCP none    Hypertension  New finding. Pt went to ED in January from epistaxis and was noted to have uncontrolled hypertension.   Admits eating habits are poor. He is not exercising. 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  He admits having a lot of stress. He will be moving in a few weeks. He went through a divorce over a year ago. He is currently a single father of an 8 year old with full time custody.  Denies family history CAD.  Denies alcohol use, cigarette smoking.   Patient denies blurred blurred, severe headaches, chest pain, chest pressure, dizziness, palpitations, syncope, orthopnea, dyspnea or exertion, or leg swelling.    Epistaxis   Established condition. Pt reports ongoing epistaxis for one month. He went to ED on 1/24/2025.  He underwent cauterization 2 weeks ago by Dr. Orta, ENT.   No epistaxis episodes since.    Right sided sciatica pain  Chronic and ongoing issue for a few years. He is having radiating pain to right knee and foot. His symptoms are worsening.   He takes tylenol for pain management.   Patient denies fevers, chills, malaise, myalgia, arthralgia, saddle anesthesia, numbness, tingling, or weakness to any extremities, or urinary/bowel incontinence.    Chronic neck pain  Chronic and ongoing issue for years. His symptoms are worsening. Denies recent trauma or injury to area.   Denies numbness, tingling, weakness to upper extremities.    GERD  Dysphagia  Ongoing issue. Pt reports taking prilosec for years. However,  "he reports intermittent episodes of difficulty swallowing.he reports feeling his food gets stuck in his throat then triggers emesis.   He denies excessive alcohol intake or NSAID use.  He denies hematemesis, severe abdominal pain, anorexia.    Allergies: Patient has no known allergies.    Current Outpatient Medications Ordered in Epic   Medication Sig Dispense Refill    losartan (COZAAR) 25 MG Tab Take 1 Tablet by mouth every day. 30 Tablet 1     No current Epic-ordered facility-administered medications on file.       Past Medical History:   Diagnosis Date    Hypertension        Past Surgical History:   Procedure Laterality Date    SHOULDER ARTHROSCOPY         Social History     Tobacco Use    Smoking status: Never    Smokeless tobacco: Never   Vaping Use    Vaping status: Never Used   Substance Use Topics    Alcohol use: Yes     Comment: Occasionally    Drug use: No       Social History     Social History Narrative    Not on file       Family History   Problem Relation Age of Onset    Asthma Mother     Cancer Father      Gen: no fevers/chills  Pulm: no sob, no cough  CV: no chest pain, no palpitations, no edema  GI: no nausea/vomiting, no diarrhea  Skin: no rash    Exam: BP (!) 142/74 (BP Location: Left arm, Patient Position: Sitting, BP Cuff Size: Adult)   Pulse 82   Temp 37.1 °C (98.7 °F) (Temporal)   Resp 16   Ht 1.753 m (5' 9\")   Wt 72.6 kg (160 lb)   SpO2 97%  Body mass index is 23.63 kg/m².    Physical examination   Constitutional: Alert, no distress, well-groomed.  Skin: Warm, dry, good turgor, no rashes in visible areas.  Eye: Equal, round and reactive, conjunctiva clear, lids normal.  ENMT: Lips without lesions, good dentition, moist mucous membranes.  Neck: Trachea midline, no masses, no thyromegaly. Spurling test negative  Respiratory: Unlabored respiratory effort, no cough.  MSK: Normal gait, moves all extremities.  Neuro: Grossly non-focal.   Psych: Alert and oriented x3, normal affect and " mood.      Please note that this dictation was created using voice recognition software. I have made every reasonable attempt to correct obvious errors, but I expect that there are errors of grammar and possibly content that I did not discover before finalizing the note.      Assessment/Plan    69 y.o. male with the following -    1. Wellness examination  PMH/PSH/FH/Social history reviewed. Medication reconciled. Vaccinations discussed. Previous records and labs reviewed. Discussed age appropriate anticipatory guidance.  Will screen for anemia, thyroid disorder, metabolic disorder, cardiovascular disease, diabetes, and vitamin deficiency. Will order labs.    - CBC WITH DIFFERENTIAL; Future  - Comp Metabolic Panel; Future  - Lipid Profile; Future  - TSH WITH REFLEX TO FT4; Future  - VITAMIN D,25 HYDROXY (DEFICIENCY); Future  - HEMOGLOBIN A1C; Future  - HEP C VIRUS ANTIBODY; Future  - PROSTATE SPECIFIC AG SCREENING; Future    2. Epistaxis  Established, stable condition after cauterization by Dr. Nito SHELL.  Follow-up with ENT as scheduled.    3. Primary hypertension  Newly diagnosed.  BP elevated today.  Will start on low-dose losartan 25 mg daily.  Medication administration and side effects discussed.  Instructed to monitor BP and keep a log.  BP goal >100/60 <140/80  Discussed lifestyle changes that include healthy diet that is rich in vegetables, fruits, fiber-rich whole grains, lean meats, poultry and fish, low in saturated and trans fats, cholesterol, sodium, and added sugars (DASH and Mediterranean diet).  Avoid/limit highly processed food and high fructose corn syrup.   Regular exercise at least 30 minutes 5 times a week.   Weight reduction if applicable (aim for 5% to 10% body weight increments).   Smoking cessation. Limit alcohol consumption.   Stress-reduction techniques such as meditation.   Practice good sleep hygiene.   Will follow-up in 4 weeks for BP and med check.    - CBC WITH DIFFERENTIAL; Future  -  Comp Metabolic Panel; Future  - Lipid Profile; Future  - TSH WITH REFLEX TO FT4; Future  - VITAMIN D,25 HYDROXY (DEFICIENCY); Future  - HEMOGLOBIN A1C; Future  - losartan (COZAAR) 25 MG Tab; Take 1 Tablet by mouth every day.  Dispense: 30 Tablet; Refill: 1    4. Chronic right-sided low back pain with right-sided sciatica  Chronic, uncontrolled condition.  Patient presentation consistent with sciatica pain. No s/s red flags or cauda equina syndrome to warrant ED.  Pt declined prescription grade NSAIDs or muscle relaxers.   Recommend conservative measures:  -Apply ice pack to affected area for short periods of time (15-20 minutes, no more than 20 minutes), several times during the day   -Then, apply heat pack after to affected area for 20 minutes intermittently throughout the day, several times a day.   -Modify or avoid activities that exacerbate pain initially  -gentle movements, perform ROM movements, and resume modest physical activity as tolerated. Apply proper body mechanics with movement. When symptoms improve, maintain weight-bearing exercise.  -May consider acupuncture for acute low back pain if you have access to a certified acupuncturist.   -May also consider spinal manipulation from a chiropractor in conjunction with conservative therapy for pain management.    -Referral to Physical Therapy for evaluation and treatment in 2-3 weeks or after pain is improved.   If no improvement in symptoms with conservative treatment over 6 weeks, will consider epidural steroid injection.     - DX-LUMBAR SPINE-2 OR 3 VIEWS; Future  - Referral to Physical Therapy    5. Chronic neck pain  Chronic, uncontrolled condition.  Patient presentation likely arthritis.  No s/s red flags or radiculopathy.  Will order imaging.  Recommend conservative therapy.  Will refer to PT    - DX-CERVICAL SPINE-2 OR 3 VIEWS; Future  - Referral to Physical Therapy    6. Dysphagia, unspecified type  Established, ongoing condition.  Suspect esophageal  sphincter dysfunction from uncontrolled GERD.  Recommend GERD tx mention below.  Will order barium swallow test  Will refer to GI  - DX-MEGAN GUERRA (BARIUM SWALLOW); Future  - Referral to Gastroenterology    7. Gastroesophageal reflux disease, unspecified whether esophagitis present  Established condition. Recommend lifestyle and dietary measures include:  -Elevate head of bed at night if having symptoms at night.  -Refrain from laying flat on back after meals  -Avoid eating meals 2 to 3 hours before bedtime.  -Avoid tight fitting clothes  -Eliminate dietary triggers such as: caffeine, chocolate, spicy food, fatty/fried foods, carbonated beverages, peppermint and acidic food or a drinks  -Avoid tobacco and alcohol use  -Decrease or eliminate use of NSAIDs    Pharmacologic treatment:  -Start with counter antacids such as TUMS as needed for symptom relief  -Use histamine 2 receptor antagonist such as Pepcid regularly up to 4 to 6 weeks  -If symptoms do not improve with use of TUMS and Pepcid will consider omeprazole such as Nexium for up to 8 weeks.  -If symptoms do not improve with above measures or if symptoms worsen will refer to GI for further work up.    8. Colon cancer screenin  - Cologuard® colon cancer screening    9. Need for hepatitis C screening test    - HEP C VIRUS ANTIBODY; Future    10. Prostate cancer screening    - PROSTATE SPECIFIC AG SCREENING; Future    Medical Decision Making/Course:  In the course of preparing for this visit with review of the pertinent past medical history, recent and past clinic visits, current medications, and performing chart, immunization, medical history and medication reconciliation, and in the further course of obtaining the current history pertinent to the clinic visit today, performing an exam and evaluation, ordering and independently evaluating labs, tests, and/or procedures, prescribing any recommended new medications as noted above, providing any pertinent  counseling and education and recommending further coordination of care. This was discussed with patient in a shared-decision making conversation, and they understand and agreed with plan of care.     Return in about 4 weeks (around 4/16/2025), or if symptoms worsen or fail to improve.    Thank you, Natalia BARCLAY  [facility]      Please note that this dictation was created using voice recognition software. I have made every reasonable attempt to correct obvious errors, but I expect that there are errors of grammar and possibly content that I did not discover before finalizing the note.

## 2025-04-10 ENCOUNTER — HOSPITAL ENCOUNTER (OUTPATIENT)
Facility: MEDICAL CENTER | Age: 70
End: 2025-04-10
Payer: COMMERCIAL

## 2025-04-10 ENCOUNTER — APPOINTMENT (OUTPATIENT)
Dept: RADIOLOGY | Facility: MEDICAL CENTER | Age: 70
End: 2025-04-10
Payer: COMMERCIAL

## 2025-04-10 ENCOUNTER — RESULTS FOLLOW-UP (OUTPATIENT)
Dept: MEDICAL GROUP | Facility: IMAGING CENTER | Age: 70
End: 2025-04-10
Payer: COMMERCIAL

## 2025-04-10 DIAGNOSIS — M54.41 CHRONIC RIGHT-SIDED LOW BACK PAIN WITH RIGHT-SIDED SCIATICA: ICD-10-CM

## 2025-04-10 DIAGNOSIS — Z00.00 WELLNESS EXAMINATION: ICD-10-CM

## 2025-04-10 DIAGNOSIS — I10 PRIMARY HYPERTENSION: ICD-10-CM

## 2025-04-10 DIAGNOSIS — G89.29 CHRONIC RIGHT-SIDED LOW BACK PAIN WITH RIGHT-SIDED SCIATICA: ICD-10-CM

## 2025-04-10 DIAGNOSIS — G89.29 CHRONIC NECK PAIN: ICD-10-CM

## 2025-04-10 DIAGNOSIS — Z12.5 PROSTATE CANCER SCREENING: ICD-10-CM

## 2025-04-10 DIAGNOSIS — M54.2 CHRONIC NECK PAIN: ICD-10-CM

## 2025-04-10 DIAGNOSIS — Z11.59 NEED FOR HEPATITIS C SCREENING TEST: ICD-10-CM

## 2025-04-10 LAB
25(OH)D3 SERPL-MCNC: 46 NG/ML (ref 30–100)
ALBUMIN SERPL BCP-MCNC: 4.5 G/DL (ref 3.2–4.9)
ALBUMIN/GLOB SERPL: 1.4 G/DL
ALP SERPL-CCNC: 102 U/L (ref 30–99)
ALT SERPL-CCNC: 33 U/L (ref 2–50)
ANION GAP SERPL CALC-SCNC: 11 MMOL/L (ref 7–16)
AST SERPL-CCNC: 30 U/L (ref 12–45)
BASOPHILS # BLD AUTO: 1.5 % (ref 0–1.8)
BASOPHILS # BLD: 0.09 K/UL (ref 0–0.12)
BILIRUB SERPL-MCNC: 0.5 MG/DL (ref 0.1–1.5)
BUN SERPL-MCNC: 17 MG/DL (ref 8–22)
CALCIUM ALBUM COR SERPL-MCNC: 9.4 MG/DL (ref 8.5–10.5)
CALCIUM SERPL-MCNC: 9.8 MG/DL (ref 8.5–10.5)
CHLORIDE SERPL-SCNC: 105 MMOL/L (ref 96–112)
CHOLEST SERPL-MCNC: 190 MG/DL (ref 100–199)
CO2 SERPL-SCNC: 26 MMOL/L (ref 20–33)
CREAT SERPL-MCNC: 1.16 MG/DL (ref 0.5–1.4)
EOSINOPHIL # BLD AUTO: 0.18 K/UL (ref 0–0.51)
EOSINOPHIL NFR BLD: 3.1 % (ref 0–6.9)
ERYTHROCYTE [DISTWIDTH] IN BLOOD BY AUTOMATED COUNT: 41.1 FL (ref 35.9–50)
EST. AVERAGE GLUCOSE BLD GHB EST-MCNC: 111 MG/DL
FASTING STATUS PATIENT QL REPORTED: NORMAL
GFR SERPLBLD CREATININE-BSD FMLA CKD-EPI: 69 ML/MIN/1.73 M 2
GLOBULIN SER CALC-MCNC: 3.2 G/DL (ref 1.9–3.5)
GLUCOSE SERPL-MCNC: 95 MG/DL (ref 65–99)
HBA1C MFR BLD: 5.5 % (ref 4–5.6)
HCT VFR BLD AUTO: 45.8 % (ref 42–52)
HCV AB SER QL: NORMAL
HDLC SERPL-MCNC: 44 MG/DL
HGB BLD-MCNC: 15.6 G/DL (ref 14–18)
IMM GRANULOCYTES # BLD AUTO: 0.02 K/UL (ref 0–0.11)
IMM GRANULOCYTES NFR BLD AUTO: 0.3 % (ref 0–0.9)
LDLC SERPL CALC-MCNC: 130 MG/DL
LYMPHOCYTES # BLD AUTO: 2 K/UL (ref 1–4.8)
LYMPHOCYTES NFR BLD: 34.2 % (ref 22–41)
MCH RBC QN AUTO: 30.2 PG (ref 27–33)
MCHC RBC AUTO-ENTMCNC: 34.1 G/DL (ref 32.3–36.5)
MCV RBC AUTO: 88.8 FL (ref 81.4–97.8)
MONOCYTES # BLD AUTO: 0.61 K/UL (ref 0–0.85)
MONOCYTES NFR BLD AUTO: 10.4 % (ref 0–13.4)
NEUTROPHILS # BLD AUTO: 2.94 K/UL (ref 1.82–7.42)
NEUTROPHILS NFR BLD: 50.5 % (ref 44–72)
NRBC # BLD AUTO: 0 K/UL
NRBC BLD-RTO: 0 /100 WBC (ref 0–0.2)
PLATELET # BLD AUTO: 306 K/UL (ref 164–446)
PMV BLD AUTO: 9.3 FL (ref 9–12.9)
POTASSIUM SERPL-SCNC: 4.9 MMOL/L (ref 3.6–5.5)
PROT SERPL-MCNC: 7.7 G/DL (ref 6–8.2)
PSA SERPL-MCNC: 0.88 NG/ML (ref 0–4)
RBC # BLD AUTO: 5.16 M/UL (ref 4.7–6.1)
SODIUM SERPL-SCNC: 142 MMOL/L (ref 135–145)
TRIGL SERPL-MCNC: 79 MG/DL (ref 0–149)
TSH SERPL DL<=0.005 MIU/L-ACNC: 1.41 UIU/ML (ref 0.38–5.33)
WBC # BLD AUTO: 5.8 K/UL (ref 4.8–10.8)

## 2025-04-10 PROCEDURE — 72040 X-RAY EXAM NECK SPINE 2-3 VW: CPT

## 2025-04-10 PROCEDURE — 80061 LIPID PANEL: CPT

## 2025-04-10 PROCEDURE — 85025 COMPLETE CBC W/AUTO DIFF WBC: CPT

## 2025-04-10 PROCEDURE — 84443 ASSAY THYROID STIM HORMONE: CPT

## 2025-04-10 PROCEDURE — 80053 COMPREHEN METABOLIC PANEL: CPT

## 2025-04-10 PROCEDURE — 72100 X-RAY EXAM L-S SPINE 2/3 VWS: CPT

## 2025-04-10 PROCEDURE — 86803 HEPATITIS C AB TEST: CPT

## 2025-04-10 PROCEDURE — 82306 VITAMIN D 25 HYDROXY: CPT

## 2025-04-10 PROCEDURE — 84153 ASSAY OF PSA TOTAL: CPT

## 2025-04-10 PROCEDURE — 36415 COLL VENOUS BLD VENIPUNCTURE: CPT

## 2025-04-10 PROCEDURE — 83036 HEMOGLOBIN GLYCOSYLATED A1C: CPT

## 2025-04-11 ENCOUNTER — RESULTS FOLLOW-UP (OUTPATIENT)
Dept: MEDICAL GROUP | Facility: IMAGING CENTER | Age: 70
End: 2025-04-11
Payer: COMMERCIAL

## 2025-04-12 LAB — NONINV COLON CA DNA+OCC BLD SCRN STL QL: NEGATIVE

## 2025-04-17 ENCOUNTER — APPOINTMENT (OUTPATIENT)
Dept: MEDICAL GROUP | Facility: IMAGING CENTER | Age: 70
End: 2025-04-17
Payer: COMMERCIAL

## 2025-04-17 VITALS
SYSTOLIC BLOOD PRESSURE: 130 MMHG | TEMPERATURE: 98.1 F | DIASTOLIC BLOOD PRESSURE: 88 MMHG | OXYGEN SATURATION: 94 % | BODY MASS INDEX: 24.11 KG/M2 | HEART RATE: 80 BPM | HEIGHT: 69 IN | RESPIRATION RATE: 16 BRPM | WEIGHT: 162.8 LBS

## 2025-04-17 DIAGNOSIS — I10 PRIMARY HYPERTENSION: ICD-10-CM

## 2025-04-17 DIAGNOSIS — M54.41 CHRONIC RIGHT-SIDED LOW BACK PAIN WITH RIGHT-SIDED SCIATICA: ICD-10-CM

## 2025-04-17 DIAGNOSIS — G89.29 CHRONIC NECK PAIN: ICD-10-CM

## 2025-04-17 DIAGNOSIS — M54.2 CHRONIC NECK PAIN: ICD-10-CM

## 2025-04-17 DIAGNOSIS — E78.00 ELEVATED LDL CHOLESTEROL LEVEL: ICD-10-CM

## 2025-04-17 DIAGNOSIS — G89.29 CHRONIC RIGHT-SIDED LOW BACK PAIN WITH RIGHT-SIDED SCIATICA: ICD-10-CM

## 2025-04-17 PROCEDURE — 3075F SYST BP GE 130 - 139MM HG: CPT

## 2025-04-17 PROCEDURE — 99214 OFFICE O/P EST MOD 30 MIN: CPT

## 2025-04-17 PROCEDURE — 3079F DIAST BP 80-89 MM HG: CPT

## 2025-04-17 PROCEDURE — 1125F AMNT PAIN NOTED PAIN PRSNT: CPT

## 2025-04-17 RX ORDER — IBUPROFEN 600 MG/1
600 TABLET, FILM COATED ORAL EVERY 6 HOURS PRN
COMMUNITY
Start: 2025-04-11

## 2025-04-17 RX ORDER — LOSARTAN POTASSIUM 25 MG/1
25 TABLET ORAL DAILY
Qty: 90 TABLET | Refills: 1 | Status: SHIPPED | OUTPATIENT
Start: 2025-04-17 | End: 2026-05-22

## 2025-04-17 RX ORDER — AMOXICILLIN 500 MG/1
CAPSULE ORAL
COMMUNITY
Start: 2025-04-12

## 2025-04-17 RX ORDER — OXYCODONE AND ACETAMINOPHEN 5; 325 MG/1; MG/1
TABLET ORAL
COMMUNITY
Start: 2025-04-11

## 2025-04-17 ASSESSMENT — PAIN SCALES - GENERAL: PAINLEVEL_OUTOF10: 7=MODERATE-SEVERE PAIN

## 2025-04-17 ASSESSMENT — FIBROSIS 4 INDEX: FIB4 SCORE: 1.14

## 2025-04-17 NOTE — PROGRESS NOTES
Subjective:     CC:   Chief Complaint   Patient presents with    Follow-Up     4 week follow up    Lab Results     04/10/2025  X ray results    Medication Management     Want to talk about losartan, doesn't think it works well, bp fluctuates       HPI:   Marie presents today to discuss:    Primary hypertension  Newly diagnosed. Patient was started on losartan 25 mg daily one month ago.  He is tolerating medications well without side effects.    He has been making lifestyle changes.  Home bp averages in 120-130s/70-80s  Patient denies blurred blurred, severe headaches, chest pain, chest pressure, dizziness, palpitations, syncope, orthopnea, dyspnea or exertion, or leg swelling.    Elevated LDL  New finding. He is working on healthy lifestyle changes.  Denies family history CAD.  Denies alcohol use, cigarette smoking.   Patient denies blurred blurred, severe headaches, chest pain, chest pressure, dizziness, palpitations, syncope, orthopnea, dyspnea or exertion, or leg swelling.      Latest Reference Range & Units 04/10/25 10:02   Cholesterol,Tot 100 - 199 mg/dL 190   Triglycerides 0 - 149 mg/dL 79   HDL >=40 mg/dL 44   LDL <100 mg/dL 130 (H)     Chronic right-sided low back pain with right-sided sciatica  Chronic condition.  Pt declined prescription grade NSAIDs or muscle relaxers.   Patient was referred to PT however, patient admits that he will not be starting PT and will try to manage this on his own.    Lumbar xray FINDINGS:  There is no definite fracture, evidence for spondylolysis, or spondylolisthesis. There is multilevel posterior facet osteoarthrosis. There is also mild disc height loss at L5-S1. Vascular calcification is present. There is mild levoconvex curvature of   the lumbar spine.     IMPRESSION:  Mild levoconvex curvature and degenerative change.    Chronic neck pain  Chronic condition. Pt declined prescription grade NSAIDs or muscle relaxers.   Patient was referred to PT however, patient admits that  he will not be starting PT and will try to manage this on his own.    Cervical xray FINDINGS:  The atlantoaxial joint is preserved and the prevertebral soft tissues are within normal limits. There is mild multilevel degenerative disc disease along with uncovertebral and posterior facet osteoarthrosis. There is mild anterolisthesis of C4 on C5 and   C6 on C7. Alignment is otherwise maintained. No fracture is evident.     IMPRESSION:  Multilevel degenerative disease.      Past Medical History:   Diagnosis Date    Hypertension     [place] teeth extracted     two teeth, top and bottom, left side     Family History   Problem Relation Age of Onset    Asthma Mother     Cancer Father      Past Surgical History:   Procedure Laterality Date    SHOULDER ARTHROSCOPY       Social History     Tobacco Use    Smoking status: Never    Smokeless tobacco: Never   Vaping Use    Vaping status: Never Used   Substance Use Topics    Alcohol use: Not Currently     Comment: Occasionally    Drug use: No     Social History     Social History Narrative    Not on file     Current Outpatient Medications Ordered in Epic   Medication Sig Dispense Refill    ibuprofen (MOTRIN) 600 MG Tab Take 600 mg by mouth every 6 hours as needed.  FOR PAIN      amoxicillin (AMOXIL) 500 MG Cap TAKE 1 CAPSULE BY MOUTH THREE TIMES DAILY UNTIL GONE START AFTER THE DENTAL SURGERY      oxyCODONE-acetaminophen (PERCOCET) 5-325 MG Tab TAKE 1/2 TO 1 (ONE-HALF TO ONE) TABLET BY MOUTH EVERY 6 HOURS AS NEEDED FOR PAIN      losartan (COZAAR) 25 MG Tab Take 1 Tablet by mouth every day. 90 Tablet 1     No current Epic-ordered facility-administered medications on file.     Patient has no known allergies.    ROS: see hpi  Gen: no fevers/chills  Pulm: no sob, no cough  CV: no chest pain, no palpitations, no edema  GI: no nausea/vomiting, no diarrhea  Skin: no rash    Objective:   Exam:  /88 (BP Location: Right arm, Patient Position: Sitting, BP Cuff Size: Adult)   Pulse 80    "Temp 36.7 °C (98.1 °F) (Temporal)   Resp 16   Ht 1.753 m (5' 9\")   Wt 73.8 kg (162 lb 12.8 oz)   SpO2 94%   BMI 24.04 kg/m²    Body mass index is 24.04 kg/m².    Gen: Alert and oriented, No apparent distress.  HEENT: Head atraumatic, normocephalic. Pupils equal and round.  Neck: Neck is supple without lymphadenopathy.   Lungs: Normal effort, CTA bilaterally, no wheezes, rhonchi, or rales  CV: Regular rate and rhythm. No murmurs, rubs, or gallops.  ABD: +BS. Non-tender, non-distended. No rebound, rigidity, or guarding.  Ext: No clubbing, cyanosis, edema.    Assessment & Plan:     69 y.o. male with the following -     1. Primary hypertension  Chronic and stable condition.  Blood pressure controlled.  Refill sent to pharmacy.  Discussed medication desired effects, potential side effects, and how to administer medication.  Nonpharmacological interventions such as low-salt, cardiac diet discussed.  Educated on stress reduction and physical activity.  Discussed signs and symptoms of major cardiovascular event and need to present to ED.    - losartan (COZAAR) 25 MG Tab; Take 1 Tablet by mouth every day.  Dispense: 90 Tablet; Refill: 1    2. Elevated LDL cholesterol level  New finding. Discussed The 10-year ASCVD risk score (Elida AGARWAL, et al., 2019) is: 18.1%  Recommend statins to reduce his risk for heart disease, vascular disease, heart attacks, and strokes. However, pt declined.   I recommend healthy lifestyle changes that include diet low in saturated fat, limit simple sugars, simple carbs, food with high fructose corn syrup, and highly processed food; eat lean protein, diet high in fresh vegetables, fruits, and fiber; exercise 30 min per day 5 times a week, avoid alcohol, stop smoking, practice stress reduction techniques, and good sleep hygiene. Weight loss of 5-10% to achieve healthy BMI. I also recommend start taking omega 3 fatty acid daily supplementation. OTC red yeast rice can also help with cholesterol " levels.   Will repeat in 3 months.    - Lipid Profile; Future    3. Chronic right-sided low back pain with right-sided sciatica  Chronic, stable condition.  Patient condition consistent with arthritis.  Pt declined prescription grade NSAIDs or muscle relaxers.   Patient was referred to PT however, patient admits that he will not be starting PT and will try to manage this on his own.    4. Chronic neck pain  Chronic, stable condition.  Patient condition consistent with arthritis.  Pt declined prescription grade NSAIDs or muscle relaxers.   Patient was referred to PT however, patient admits that he will not be starting PT and will try to manage this on his own.    Return in about 6 months (around 10/17/2025), or if symptoms worsen or fail to improve, for f/u labs.    AZEB Acuna   [facility]    Medical Decision Making/Course:  In the course of preparing for this visit with review of the pertinent past medical history, recent and past clinic visits, current medications, and performing chart, immunization, medical history and medication reconciliation, and in the further course of obtaining the current history pertinent to the clinic visit today, performing an exam and evaluation, ordering and independently evaluating labs, tests, and/or procedures, prescribing any recommended new medications as noted above, providing any pertinent counseling and education and recommending further coordination of care. This was discussed with patient in a shared-decision making conversation, and they understand and agreed with plan of care.     Please note that this dictation was created using voice recognition software. I have made every reasonable attempt to correct obvious errors, but I expect that there are errors of grammar and possibly content that I did not discover before finalizing the note.

## 2025-05-15 ENCOUNTER — APPOINTMENT (OUTPATIENT)
Dept: RADIOLOGY | Facility: MEDICAL CENTER | Age: 70
End: 2025-05-15
Payer: COMMERCIAL